# Patient Record
Sex: MALE | Race: WHITE | NOT HISPANIC OR LATINO | Employment: OTHER | URBAN - METROPOLITAN AREA
[De-identification: names, ages, dates, MRNs, and addresses within clinical notes are randomized per-mention and may not be internally consistent; named-entity substitution may affect disease eponyms.]

---

## 2017-02-15 ENCOUNTER — GENERIC CONVERSION - ENCOUNTER (OUTPATIENT)
Dept: OTHER | Facility: OTHER | Age: 82
End: 2017-02-15

## 2017-03-17 ENCOUNTER — GENERIC CONVERSION - ENCOUNTER (OUTPATIENT)
Dept: OTHER | Facility: OTHER | Age: 82
End: 2017-03-17

## 2017-03-28 ENCOUNTER — GENERIC CONVERSION - ENCOUNTER (OUTPATIENT)
Dept: OTHER | Facility: OTHER | Age: 82
End: 2017-03-28

## 2017-04-17 ENCOUNTER — ALLSCRIPTS OFFICE VISIT (OUTPATIENT)
Dept: OTHER | Facility: OTHER | Age: 82
End: 2017-04-17

## 2017-07-11 ENCOUNTER — ALLSCRIPTS OFFICE VISIT (OUTPATIENT)
Dept: OTHER | Facility: OTHER | Age: 82
End: 2017-07-11

## 2017-07-11 DIAGNOSIS — N18.4 CHRONIC KIDNEY DISEASE, STAGE IV (SEVERE) (HCC): ICD-10-CM

## 2017-07-18 ENCOUNTER — GENERIC CONVERSION - ENCOUNTER (OUTPATIENT)
Dept: OTHER | Facility: OTHER | Age: 82
End: 2017-07-18

## 2017-08-02 ENCOUNTER — GENERIC CONVERSION - ENCOUNTER (OUTPATIENT)
Dept: OTHER | Facility: OTHER | Age: 82
End: 2017-08-02

## 2017-09-18 ENCOUNTER — ALLSCRIPTS OFFICE VISIT (OUTPATIENT)
Dept: OTHER | Facility: OTHER | Age: 82
End: 2017-09-18

## 2017-09-18 ENCOUNTER — GENERIC CONVERSION - ENCOUNTER (OUTPATIENT)
Dept: OTHER | Facility: OTHER | Age: 82
End: 2017-09-18

## 2017-09-18 DIAGNOSIS — M19.079 PRIMARY OSTEOARTHRITIS, UNSPECIFIED ANKLE AND FOOT: ICD-10-CM

## 2017-09-19 ENCOUNTER — GENERIC CONVERSION - ENCOUNTER (OUTPATIENT)
Dept: OTHER | Facility: OTHER | Age: 82
End: 2017-09-19

## 2017-09-19 LAB — URIC ACID (HISTORICAL): 9.5 MG/DL (ref 3.7–8.6)

## 2017-09-25 ENCOUNTER — GENERIC CONVERSION - ENCOUNTER (OUTPATIENT)
Dept: OTHER | Facility: OTHER | Age: 82
End: 2017-09-25

## 2017-11-21 ENCOUNTER — GENERIC CONVERSION - ENCOUNTER (OUTPATIENT)
Dept: OTHER | Facility: OTHER | Age: 82
End: 2017-11-21

## 2018-01-10 NOTE — PROGRESS NOTES
Assessment    1  Medicare annual wellness visit, subsequent (V70 0) (Z00 00)   2  Need for shingles vaccine (V04 89) (Z23)    Plan  DM2 (diabetes mellitus, type 2)    · [facility] (PODIATRY ) Co-Management  *  Status: Active  Requested for:  84Cyx5437  Care Summary provided  : Yes  Medicare annual wellness visit, subsequent    · Begin a limited exercise program ; Status:Complete;   Done: 94PGZ0820   · Eat a low fat and low cholesterol diet ; Status:Complete;   Done: 69RCI5422   · These are things you can do to prevent falls in and around the home ; Status:Complete;    Done: 54NJX1490  Need for shingles vaccine    · Zostavax 26736 UNT/0 65ML Subcutaneous Solution Reconstituted (Zostavax  99865 UNT/0 65ML Subcutaneous Solution Reconstituted); as directed    Discussion/Summary    AWV completed  Advised him to bring in a copy of advance directives  Shingles vaccine ordered  Impression: Subsequent Annual Wellness Visit, with preventive exam as well as age and risk appropriate counseling completed  Cardiovascular screening and counseling: the risks and benefits of screening were discussed and screening is current  Diabetes screening and counseling: the risks and benefits of screening were discussed and screening is current  Colorectal cancer screening and counseling: the risks and benefits of screening were discussed and screening not indicated  Prostate cancer screening and counseling: the risks and benefits of screening were discussed and screening not indicated  Osteoporosis screening and counseling: the risks and benefits of screening were discussed and screening not indicated  Abdominal aortic aneurysm screening and counseling: the patient declines screening  Glaucoma screening and counseling: the risks and benefits of screening were discussed and screening is current  HIV screening and counseling: the patient declines screening and screening not indicated   Immunizations: the risks and benefits of influenza vaccination were discussed with the patient, influenza vaccine is up to date this year, the risks and benefits of pneumococcal vaccination were discussed with the patient, the lifetime pneumococcal vaccine has been completed, the risks and benefits of the Zostavax vaccine were discussed with the patient and Zostavax vaccine needed today  Advance Directive Planning: complete and up to date, he was encouraged to follow-up with me to discuss his questions and/or decisions, Adised him to bring a copy in  Chief Complaint  Patient is here today for Medicare Wellness      History of Present Illness  The patient is being seen for the subsequent annual wellness visit  Medicare Screening and Risk Factors   Hospitalizations: no previous hospitalizations  Once per lifetime medicare screening tests: AAA screening [place] has not yet been done  Medicare Screening Tests Risk Questions   Abdominal aortic aneurysm risk assessment: tobacco use and over 72years of age  Osteoporosis risk assessment: , over 48years of age and past medical history of fracture(s), but none indicated  HIV risk assessment: none indicated  Drug and Alcohol Use: The patient is a former cigarette smoker and quit smoking 37 years ago  The patient reports never drinking alcohol  He has never used illicit drugs  Diet and Physical Activity: Current diet includes well balanced meals, limited junk food, 2 servings of fruit per day, 3 servings of vegetables per day, 1 servings of meat per day, 2 servings of whole grains per day, 1 servings of dairy products per day, 1 cups of coffee per day and 1 cans of diet soda per day  He is sedentary  Exercise: walking 30 minutes per day  Mood Disorder and Cognitive Impairment Screening: PHQ-9 Depression Scale   Depression screening  no significant symptoms  He denies feeling down, depressed, or hopeless over the past two weeks   He denies feeling little interest or pleasure in doing things over the past two weeks  Cognitive impairment screening: denies difficulty learning/retaining new information, denies difficulty handling complex tasks, denies difficulty with reasoning, denies difficulty with language and denies difficulty with behavior  Functional Ability/Level of Safety: Hearing is normal bilaterally, normal in the right ear, normal in the left ear and a hearing aid is not used  The patient is currently able to do activities of daily living without limitations, able to do instrumental activities of daily living without limitations and able to drive without limitations  Activities of daily living details: needs help managing medications and needs help managing money, but does not need help using the phone, no transportation help needed, does not need help shopping, no meal preparation help needed, does not need help doing housework and does not need help doing laundry  Fall risk factors:  polypharmacy and antihypertensive use, but The patient fell 0 times in the past 12 months  Home safety risk factors:  no unfamiliar surroundings, no loose rugs, no poor household lighting, no uneven floors, no household clutter, grab bars in the bathroom and handrails on the stairs  Advance Directives: Advance directives: living will, durable power of  for health care directives, advance directives and their children  end of life decisions were reviewed with the patient  Co-Managers and Medical Equipment/Suppliers: See Patient Care Team      Patient Care Team    Care Team Member Role Specialty Office Kathy ANTHONY  Family Medicine [phone]   [place] ANTHONY  Specialist Nephrology [phone]   [facility] Specialist Cardiology [phone]   [facility]  Endocrinology [phone]     Review of Systems    Constitutional: negative  Eyes: negative  ENT: negative  Cardiovascular: negative  Respiratory: negative     Gastrointestinal: negative  Genitourinary: negative  Musculoskeletal: negative  Integumentary and Breasts: negative  Neurological: negative  Psychiatric: negative  Endocrine: negative  Hematologic and Lymphatic: negative  Over the past 2 weeks, how often have you been bothered by the following problems? 1 ) Little interest or pleasure in doing things? Several days  2 ) Feeling down, depressed or hopeless? Not at all    3 ) Trouble falling asleep or sleeping too much? Not at all    4 ) Feeling tired or having little energy? Several days  5 ) Poor appetite or overeating? Several days  6 ) Feeling bad about yourself, or that you are a failure, or have let yourself or your family down? Not at all    7 ) Trouble concentrating on things, such as reading a newspaper or watching television? Not at all    8 ) Moving or speaking so slowly that other people could have noticed, or the opposite, moving or speaking faster than usual? Not at all    9 ) Thoughts that you would be better off dead or of hurting yourself in some way? 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) (I25 10)   2  Chronic kidney disease (585 9) (N18 9)   3  Chronic kidney disease (CKD), stage 3 (moderate) (585 3) (N18 3)   4  Flu vaccine need (V04 81) (Z23)   5  Medicare annual wellness visit, initial (V70 0) (Z00 00)   6  Need for diphtheria-tetanus-pertussis (Tdap) vaccine, adult/adolescent (V06 1) (Z23)   7  Need for influenza vaccination (V04 81) (Z23)   8  Need for pneumococcal vaccination (V03 82) (Z23)   9  Need for shingles vaccine (V04 89) (Z23)   10  Negative depression screening   11  Normal routine physical examination (V70 0) (Z00 00)    DM2 (diabetes mellitus, type 2) (250 00) (E11 9)       History of hypothyroidism (V12 29) (Z86 39)       CKD (chronic kidney disease), stage 4 (severe) (585 4) (N18 4)          Past Medical History    1  History of Asthma (493 90) (J65 871)   2  History of Benign essential hypertension (401 1) (I10)   3  History of CABG Origin SVG-4 Insertion Sites Visualized L Postlat-1   4  History of Bell's palsy (V12 49) (Z86 69)   5  History of chronic obstructive lung disease (V12 69) (Z87 09)   6  History of diabetic retinopathy (V12 29) (Z86 39)   7  History of hypercholesterolemia (V12 29) (Z86 39)   8  History of Pneumonia (V12 61)   9  History of Vitamin D deficiency (268 9) (E55 9)    The active problems and past medical history were reviewed and updated today  Surgical History    1  History of Cataract Surgery   2  History of Cath Stent Placement   3  History Of Prior Surgery   4  History of Pacemaker Placement   5  History of Prostate Surgery   6  History of Transurethral Resection Of Prostate (TURP)    The surgical history was reviewed and updated today  Family History  Mother    1  Denied: Family history of substance abuse   2  Denied: Family history of Mental problems  Father    3  Denied: Family history of substance abuse   4  Denied: Family history of Mental problems  Sister    5  Family history of Diabetes mellitus due to underlying condition with complications    The family history was reviewed and updated today  Social History    · Active advance directive (V49 89) (Z78 9)   · Denied: Alcohol Use (History)   · Denied: Caffeine Use   · Employed   · Exercises occasionally (V49 89) (Z78 9)   · Former smoker (R98 91) (Z87 891)   ·    · Never used chewing tobacco (V49 89) (Z78 9)   · Non drinker / no alcohol use   · Occasional caffeine consumption   · Retired   · Two children  The social history was reviewed and updated today  Current Meds   1  Cozaar 50 MG Oral Tablet; TAKE 1 TABLET DAILY; Therapy: (Recorded:37Ugj3869) to Recorded   2  Imdur 60 MG TB24; TAKE 1 TABLET ONCE DAILY; Therapy: (Recorded:74Uqe6321) to Recorded   3  Iron 325 (65 Fe) MG Oral Tablet; TAKE 1 TABLET DAILY;    Therapy: (Recorded:27Lyp7299) to Recorded 4  Lasix 40 MG Oral Tablet; Take 1 tablet daily; Therapy: (Recorded:39Zja7577) to Recorded   5  Levemir FlexPen 100 UNIT/ML SOLN; 12 UNITS IN THE MORNING AND 10 UNITS AT   NIGHT; Therapy: (Recorded:52Hrk8622) to Recorded   6  Lipitor 40 MG Oral Tablet; TAKE 1 TABLET DAILY; Therapy: (Recorded:17Xow8563) to Recorded   7  Metoprolol Tartrate 50 MG Oral Tablet; Take 1 tablet twice daily; Therapy: (Recorded:17Jrx9751) to Recorded   8  Multi-Vitamin TABS; TAKE 1 TABLET DAILY; Therapy: (Recorded:42Mrx0185) to Recorded   9  NovoLOG FlexPen 100 UNIT/ML SOLN; SLIDING SCALE 6-8-8; Therapy: (Recorded:67Ymy0359) to Recorded   10  Plavix 75 MG Oral Tablet; TAKE 1 TABLET DAILY; Therapy: (Recorded:64Nxx9311) to Recorded   11  Synthroid 88 MCG Oral Tablet; TAKE 1 TABLET DAILY; Therapy: (Recorded:39Ers1822) to Recorded   12  Vitamin D 1000 UNIT Oral Tablet; TAKE 1 TABLET DAILY; Therapy: (Recorded:27Fno6380) to Recorded    The medication list was reviewed and updated today  Allergies    1  No Known Drug Allergies    Immunizations  Influenza --- Fletcher [place]: [id number] Fetch: 24-Sep-2013; Micki Arceo: 25-Sep-2014; Series4:  08-Sep-2015; Series5: 08-Sep-2015; Series6: 15-Sep-2016   PCV --- Series1: 08-Sep-2015; [place]: 08-Sep-2015   Tdap --- Fletcher Nice: Temporarily Deferred: Pt requests deferral; [place]: Temporarily Deferred: Pt  requests deferral   Tetanus --- Fletcher Nice: 08-Feb-2006   Zoster --- Fletcher Nice: Temporarily Deferred: Pt requests deferral     Future Appointments    Date/Time Provider Specialty Site   07/11/2017 01:30 PM LINDSEY Kim   Nephrology [address]     Signatures   Electronically signed by : Jo Herrera MD; Apr 17 2017 11:59AM EST                       (Author)

## 2018-01-12 NOTE — RESULT NOTES
Message  Labs ordered by Dr Renetta Giron reviewed with patient this morning      Signatures   Electronically signed by : LINDSEY Oh ; Sep 15 2016 11:23AM EST                       (Author)

## 2018-01-12 NOTE — RESULT NOTES
Message  Lab studies reviewed, ordered by other practitioners  All appear to be stable   Will review with patient office visit next week      Signatures   Electronically signed by : LINDSEY Morirs ; Mar 10 2016 10:12AM EST                       (Author)

## 2018-01-14 VITALS
HEIGHT: 66 IN | WEIGHT: 160.6 LBS | TEMPERATURE: 96.4 F | HEART RATE: 60 BPM | SYSTOLIC BLOOD PRESSURE: 128 MMHG | BODY MASS INDEX: 25.81 KG/M2 | DIASTOLIC BLOOD PRESSURE: 64 MMHG | OXYGEN SATURATION: 99 %

## 2018-01-14 VITALS
RESPIRATION RATE: 16 BRPM | SYSTOLIC BLOOD PRESSURE: 120 MMHG | HEART RATE: 69 BPM | WEIGHT: 160.5 LBS | TEMPERATURE: 97.4 F | BODY MASS INDEX: 25.79 KG/M2 | DIASTOLIC BLOOD PRESSURE: 70 MMHG | HEIGHT: 66 IN

## 2018-01-14 VITALS
HEIGHT: 66 IN | OXYGEN SATURATION: 93 % | HEART RATE: 68 BPM | SYSTOLIC BLOOD PRESSURE: 122 MMHG | DIASTOLIC BLOOD PRESSURE: 70 MMHG | TEMPERATURE: 97.2 F | BODY MASS INDEX: 26.22 KG/M2 | WEIGHT: 163.13 LBS

## 2018-01-15 NOTE — MISCELLANEOUS
Message  spoke to pt's wife  Uric acid is high    Start Colchicine  Stay on Keflex  Recheck next wk      Signatures   Electronically signed by : Carrie Saunders MD; Sep 19 2017  4:54PM EST                       (Author)

## 2018-01-22 VITALS
TEMPERATURE: 97.8 F | OXYGEN SATURATION: 98 % | HEIGHT: 66 IN | DIASTOLIC BLOOD PRESSURE: 80 MMHG | HEART RATE: 64 BPM | WEIGHT: 160 LBS | BODY MASS INDEX: 25.71 KG/M2 | SYSTOLIC BLOOD PRESSURE: 124 MMHG

## 2018-02-23 LAB
APPEARANCE UR: CLEAR
BILIRUB UR QL STRIP: NEGATIVE
BUN SERPL-MCNC: 76 MG/DL (ref 10–36)
BUN/CREAT SERPL: 30 (ref 10–24)
CALCIUM SERPL-MCNC: 8.7 MG/DL (ref 8.6–10.2)
CHLORIDE SERPL-SCNC: 99 MMOL/L (ref 96–106)
CO2 SERPL-SCNC: 23 MMOL/L (ref 18–29)
COLOR UR: YELLOW
CREAT SERPL-MCNC: 2.57 MG/DL (ref 0.76–1.27)
CREAT UR-MCNC: 67.8 MG/DL
ERYTHROCYTE [DISTWIDTH] IN BLOOD BY AUTOMATED COUNT: 14.7 % (ref 12.3–15.4)
GLUCOSE SERPL-MCNC: 122 MG/DL (ref 65–99)
GLUCOSE UR QL: NEGATIVE
HCT VFR BLD AUTO: 36.7 % (ref 37.5–51)
HGB BLD-MCNC: 11.9 G/DL (ref 13–17.7)
HGB UR QL STRIP: NEGATIVE
KETONES UR QL STRIP: NEGATIVE
LEUKOCYTE ESTERASE UR QL STRIP: NEGATIVE
MCH RBC QN AUTO: 30.2 PG (ref 26.6–33)
MCHC RBC AUTO-ENTMCNC: 32.4 G/DL (ref 31.5–35.7)
MCV RBC AUTO: 93 FL (ref 79–97)
MICRO URNS: NORMAL
NITRITE UR QL STRIP: NEGATIVE
PH UR STRIP: 6.5 [PH] (ref 5–7.5)
PHOSPHATE SERPL-MCNC: 4.3 MG/DL (ref 2.5–4.5)
PLATELET # BLD AUTO: 167 X10E3/UL (ref 150–379)
POTASSIUM SERPL-SCNC: 4.5 MMOL/L (ref 3.5–5.2)
PROT UR QL STRIP: NEGATIVE
PROT UR-MCNC: 16.2 MG/DL
PROT/CREAT UR: 239 MG/G CREAT (ref 0–200)
PTH-INTACT SERPL-MCNC: 109 PG/ML (ref 15–65)
RBC # BLD AUTO: 3.94 X10E6/UL (ref 4.14–5.8)
SL AMB EGFR AFRICAN AMERICAN: 24 ML/MIN/1.73
SL AMB EGFR NON AFRICAN AMERICAN: 21 ML/MIN/1.73
SODIUM SERPL-SCNC: 141 MMOL/L (ref 134–144)
SP GR UR: 1.01 (ref 1–1.03)
UROBILINOGEN UR STRIP-ACNC: 0.2 EU/DL (ref 0.2–1)
WBC # BLD AUTO: 8.4 X10E3/UL (ref 3.4–10.8)

## 2018-02-28 ENCOUNTER — OFFICE VISIT (OUTPATIENT)
Dept: NEPHROLOGY | Facility: CLINIC | Age: 83
End: 2018-02-28
Payer: MEDICARE

## 2018-02-28 VITALS — HEIGHT: 65 IN | BODY MASS INDEX: 26.46 KG/M2 | TEMPERATURE: 97.9 F | WEIGHT: 158.8 LBS

## 2018-02-28 DIAGNOSIS — N18.4 CKD (CHRONIC KIDNEY DISEASE) STAGE 4, GFR 15-29 ML/MIN (HCC): Primary | ICD-10-CM

## 2018-02-28 DIAGNOSIS — N18.4 TYPE 2 DIABETES MELLITUS WITH STAGE 4 CHRONIC KIDNEY DISEASE, UNSPECIFIED LONG TERM INSULIN USE STATUS: ICD-10-CM

## 2018-02-28 DIAGNOSIS — E11.22 TYPE 2 DIABETES MELLITUS WITH STAGE 4 CHRONIC KIDNEY DISEASE, UNSPECIFIED LONG TERM INSULIN USE STATUS: ICD-10-CM

## 2018-02-28 PROBLEM — E11.319 DIABETES WITH RETINOPATHY (HCC): Status: ACTIVE | Noted: 2017-04-17

## 2018-02-28 PROCEDURE — 99213 OFFICE O/P EST LOW 20 MIN: CPT | Performed by: INTERNAL MEDICINE

## 2018-02-28 RX ORDER — ISOSORBIDE MONONITRATE 60 MG/1
1 TABLET, EXTENDED RELEASE ORAL DAILY
COMMUNITY

## 2018-02-28 RX ORDER — FUROSEMIDE 40 MG/1
40 TABLET ORAL DAILY
COMMUNITY

## 2018-02-28 RX ORDER — PNV NO.95/FERROUS FUM/FOLIC AC 28MG-0.8MG
1 TABLET ORAL DAILY
COMMUNITY

## 2018-02-28 RX ORDER — METOPROLOL TARTRATE 50 MG/1
1 TABLET, FILM COATED ORAL 2 TIMES DAILY
COMMUNITY

## 2018-02-28 RX ORDER — LOSARTAN POTASSIUM 50 MG/1
1 TABLET ORAL DAILY
COMMUNITY

## 2018-02-28 RX ORDER — CLOPIDOGREL BISULFATE 75 MG/1
1 TABLET ORAL DAILY
COMMUNITY
End: 2018-08-28 | Stop reason: ALTCHOICE

## 2018-02-28 RX ORDER — ATORVASTATIN CALCIUM 40 MG/1
1 TABLET, FILM COATED ORAL DAILY
COMMUNITY

## 2018-02-28 RX ORDER — MULTIVITAMIN
1 TABLET ORAL DAILY
COMMUNITY

## 2018-02-28 NOTE — PROGRESS NOTES
NEPHROLOGY OFFICE FOLLOW UP  Lambert Palafox 80 y o  male MRN: [id number]    Encounter: [id number] 2/28/2018    REASON FOR VISIT: Lambert Palaofx is a 80 y o  male who is here on 2/28/2018 for No chief complaint on file  Aide Andrews HPI:    Sav Martines came in today for follow-up of stage IV CKD  He is feeling quite well and looks great for his age  Denies any complaint today        REVIEW OF SYSTEMS:    Review of Systems   Constitutional: Negative for activity change and fatigue  HENT: Negative for congestion and ear discharge  Eyes: Negative for photophobia and pain  Respiratory: Negative for apnea and choking  Cardiovascular: Negative for chest pain and palpitations  Gastrointestinal: Negative for abdominal distention and blood in stool  Endocrine: Negative for heat intolerance and polyphagia  Genitourinary: Negative for flank pain and urgency  Musculoskeletal: Negative for neck pain and neck stiffness  Skin: Negative for color change and wound  Allergic/Immunologic: Negative for food allergies and immunocompromised state  Neurological: Negative for seizures and facial asymmetry  Hematological: Negative for adenopathy  Does not bruise/bleed easily  Psychiatric/Behavioral: Negative for self-injury and suicidal ideas           PAST MEDICAL HISTORY:  Past Medical History:   Diagnosis Date    Chronic kidney disease     Diabetes mellitus ([address] )     Hypertension     Pacemaker     Short-term memory loss        PAST SURGICAL HISTORY:  Past Surgical History:   Procedure Laterality Date    CARDIAC SURGERY      RADIAL HEAD EXCISION         SOCIAL HISTORY:  History   Alcohol Use    Yes     Comment: RARELY      History   Drug Use No     History   Smoking Status    Former Smoker    Quit date: 2/28/1973   Smokeless Tobacco    Former User       FAMILY HISTORY:  Family History   Problem Relation Age of Onset    Heart attack Mother     Diabetes Maternal Aunt        MEDICATIONS:    Current Outpatient Prescriptions:     atorvastatin (LIPITOR) 40 mg tablet, Take 1 tablet by mouth daily, Disp: , Rfl:     cholecalciferol (VITAMIN D3) 1,000 units tablet, Take 1 tablet by mouth daily, Disp: , Rfl:     clopidogrel (PLAVIX) 75 mg tablet, Take 1 tablet by mouth daily, Disp: , Rfl:     Ferrous Sulfate (IRON) 325 (65 Fe) MG TABS, Take 1 tablet by mouth daily, Disp: , Rfl:     furosemide (LASIX) 40 mg tablet, Take 40 mg by mouth daily  , Disp: , Rfl:     insulin aspart (NovoLOG) 100 units/mL injection, Inject under the skin, Disp: , Rfl:     insulin detemir (LEVEMIR) 100 units/mL subcutaneous injection, Inject under the skin, Disp: , Rfl:     isosorbide mononitrate (IMDUR) 60 mg 24 hr tablet, Take 1 tablet by mouth daily, Disp: , Rfl:     losartan (COZAAR) 50 mg tablet, Take 1 tablet by mouth daily, Disp: , Rfl:     metoprolol tartrate (LOPRESSOR) 50 mg tablet, Take 1 tablet by mouth 2 (two) times a day, Disp: , Rfl:     Multiple Vitamin (MULTI-VITAMIN DAILY) TABS, Take 1 tablet by mouth daily, Disp: , Rfl:     PHYSICAL EXAM:  Vitals:    02/28/18 1333   Temp: 97 9 °F (36 6 °C)   TempSrc: Oral   Weight: 72 kg (158 lb 12 8 oz)   Height: 5' 5" (1 651 m)     Body mass index is 26 43 kg/m²  Physical Exam   Constitutional: He is oriented to person, place, and time  He appears well-developed  No distress  HENT:   Head: Normocephalic  Mouth/Throat: Oropharynx is clear and moist    Eyes: Conjunctivae are normal  No scleral icterus  Neck: Normal range of motion  Neck supple  No JVD present  Cardiovascular: Normal rate and normal heart sounds  Pulmonary/Chest: Effort normal and breath sounds normal  He has no wheezes  He has no rales  Abdominal: Soft  Bowel sounds are normal  There is no tenderness  Musculoskeletal: Normal range of motion  He exhibits no edema  Neurological: He is alert and oriented to person, place, and time  Skin: Skin is warm  No rash noted     Psychiatric: He has a normal mood and affect  His behavior is normal        LAB RESULTS:  Results for orders placed or performed in visit on 02/21/18   Urinalysis with reflex to microscopic   Result Value Ref Range    Specific Gravity 1 015 1 005 - 1 030    Ph 6 5 5 0 - 7 5    SL AMB COLOR, URINE Yellow Yellow    Urine Appearance Clear Clear    SL AMB LEUKOCYTE ESTERASE URINE Negative Negative    Protein Negative Negative/Trace    Glucose, Urine Negative Negative    SL AMB KETONE, URINE, QUAL  Negative Negative    SL AMB BLOOD, URINE Negative Negative    SL AMB BILIRUBIN, URINE Negative Negative    Urobilinogen Urine 0 2 0 2 - 1 0 EU/dL    SL AMB NITRITES URINE, QUAL   Negative Negative    Microscopic Examination Comment    CBC   Result Value Ref Range    SL AMB LAB WHITE BLOOD CELL COUNT 8 4 3 4 - 10 8 x10E3/uL    SL AMB LAB RED BLOOD CELLS 3 94 (L) 4 14 - 5 80 x10E6/uL    Hemoglobin 11 9 (L) 13 0 - 17 7 g/dL    Hematocrit 36 7 (L) 37 5 - 51 0 %    MCV 93 79 - 97 fL    MCH 30 2 26 6 - 33 0 pg    MCHC 32 4 31 5 - 35 7 g/dL    RDW 14 7 12 3 - 15 4 %    Platelet Count 920 704 - 379 [id number]/RU   Basic metabolic panel   Result Value Ref Range    SL AMB GLUCOSE 122 (H) 65 - 99 mg/dL    BUN 76 (HH) 10 - 36 mg/dL    Creatinine, Serum 2 57 (H) 0 76 - 1 27 mg/dL    eGFR Non African American 21 (L) >59 mL/min/1 73    SL AMB EGFR AFRICAN AMERICAN 24 (L) >59 mL/min/1 73    SL AMB BUN/CREATININE RATIO 30 (H) 10 - 24    SL AMB SODIUM 141 134 - 144 mmol/L    SL AMB POTASSIUM 4 5 3 5 - 5 2 mmol/L    SL AMB CHLORIDE 99 96 - 106 mmol/L    SL AMB CARBON DIOXIDE 23 18 - 29 mmol/L    CALCIUM 8 7 8 6 - 10 2 mg/dL   Protein / creatinine ratio, urine   Result Value Ref Range    Creatinine, Urine 67 8 Not Estab  mg/dL    SL AMB TOTAL PROTEIN, URINE 16 2 Not Estab  mg/dL    Prot/Creat Ratio, Ur 239 (H) 0 - 200 mg/g creat   Phosphorus   Result Value Ref Range    Phosphorus, Serum 4 3 2 5 - 4 5 mg/dL   PTH, intact   Result Value Ref Range    PTH, Intact 109 (H) 15 - 65 pg/mL ASSESSMENT and PLAN:      Stage 4 chronic kidney disease ([facility] )  Renal function is stable at GFR of 24  Advised him to continue what is doing  He is good drinking quite a bit of water which I advised him to continue  Advised to avoid any nephrotoxic medicine of procedure    DM2 (diabetes mellitus, type 2) ([facility] )  Diabetes is reasonably well controlled with present medication  It is being closely monitored by primary doctor        I will see him back in 6 months again  Goal is to keep him comfortable at his age  He will call me if there is any the problem and I will get any lab report which was order by any other doctor  Portions of the record may have been created with voice recognition software  Occasional wrong word or "sound a like" substitutions may have occurred due to the inherent limitations of voice recognition software  Read the chart carefully and recognize, using context, where substitutions have occurred  If you have any questions, please contact the dictating provider

## 2018-02-28 NOTE — PATIENT INSTRUCTIONS
Chronic Kidney Disease   AMBULATORY CARE:   Chronic kidney disease (CKD)  is the gradual and permanent loss of kidney function  Normally, the kidneys remove fluid, chemicals, and waste from your blood  These wastes are turned into urine by your kidneys  CKD may worsen over time and lead to kidney failure  Common symptoms include the following:   · Changes in how often you need to urinate    · Swelling in your arms, legs, or feet    · Shortness of breath    · Fatigue or weakness    · Bad or bitter taste in your mouth    · Nausea, vomiting, or loss of appetite  Seek care immediately if:   · You are confused and very drowsy  · You have a seizure  · You have shortness of breath  Contact your healthcare provider if:   · You suddenly gain or lose more weight than your healthcare provider has told you is okay  · You have itchy skin or a rash  · You urinate more or less than you normally do  · You have blood in your urine  · You have nausea and repeated vomiting  · You have fatigue or muscle weakness  · You have hiccups that will not stop  · You have questions or concerns about your condition or care  Treatment for CKD:  Medicines may be given to decrease blood pressure and get rid of extra fluid  You may also receive medicine to manage health conditions that may occur with CKD  Dialysis is a treatment to remove chemicals and waste from your blood when your kidneys can no longer do this  Surgery may be needed to create an arteriovenous fistula (AVF) in your arm or insert a catheter into your abdomen so that you can receive dialysis  A kidney transplant may be done if your CKD becomes severe  Manage CKD:   · Maintain a healthy weight  Ask your healthcare provider how much you should weigh  Ask him to help you create a weight loss plan if you are overweight  · Exercise 30 to 60 minutes a day, 4 to 7 times a week, or as directed  Ask about the best exercise plan for you   Regular exercise can help you manage CKD, high blood pressure, and diabetes  · Follow your healthcare provider's advice about what to eat and drink  He may tell you to eat food low in sodium (salt), potassium, phosphorus, or protein  You may need to see a dietitian if you need help planning meals  Ask how much liquid to drink each day and which liquids are best for you  · Limit alcohol  Ask how much alcohol is safe for you to drink  A drink of alcohol is 12 ounces of beer, 5 ounces of wine, or 1½ ounces of liquor  · Do not smoke  Nicotine and other chemicals in cigarettes and cigars can cause lung and kidney damage  Ask your healthcare provider for information if you currently smoke and need help to quit  E-cigarettes or smokeless tobacco still contain nicotine  Talk to your healthcare provider before you use these products  · Ask your healthcare provider if you need vaccines  Infections such as pneumonia, influenza, and hepatitis can be more harmful or more likely to occur in a person who has CKD  Vaccines reduce your risk of infection with these viruses  Follow up with your healthcare provider as directed:  Write down your questions so you remember to ask them during your visits  © 2017 [employer] Information is for End User's use only and may not be sold, redistributed or otherwise used for commercial purposes  All illustrations and images included in CareNotes® are the copyrighted property of [employer]  or Mikey Velazquez  The above information is an  only  It is not intended as medical advice for individual conditions or treatments  Talk to your doctor, nurse or pharmacist before following any medical regimen to see if it is safe and effective for you

## 2018-02-28 NOTE — ASSESSMENT & PLAN NOTE
Renal function is stable at GFR of 24  Advised him to continue what is doing  He is good drinking quite a bit of water which I advised him to continue    Advised to avoid any nephrotoxic medicine of procedure

## 2018-02-28 NOTE — ASSESSMENT & PLAN NOTE
Diabetes is reasonably well controlled with present medication    It is being closely monitored by primary doctor

## 2018-02-28 NOTE — LETTER
February 28, 2018     Contreras Torres MD  [address]  [address] [facility]  [address]    Patient: Chasity Newman   YOB: 1924   Date of Visit: 2/28/2018       Dear Dr Andre Luevano: Thank you for referring Adán Guzman to me for evaluation  Below are my notes for this consultation  If you have questions, please do not hesitate to call me  I look forward to following your patient along with you  Sincerely,        Otoniel Toney MD        CC: No Recipients  Otoniel Toney MD  2/28/2018  2:07 PM  Sign at close encounter  [address] 80 y o  male MRN: [id number]    Encounter: [id number] 2/28/2018    REASON FOR VISIT: Chasity Newman is a 80 y o  male who is here on 2/28/2018 for No chief complaint on file  Lajean Cassette HPI:    Carl Sanders came in today for follow-up of stage IV CKD  He is feeling quite well and looks great for his age  Denies any complaint today        REVIEW OF SYSTEMS:    Review of Systems   Constitutional: Negative for activity change and fatigue  HENT: Negative for congestion and ear discharge  Eyes: Negative for photophobia and pain  Respiratory: Negative for apnea and choking  Cardiovascular: Negative for chest pain and palpitations  Gastrointestinal: Negative for abdominal distention and blood in stool  Endocrine: Negative for heat intolerance and polyphagia  Genitourinary: Negative for flank pain and urgency  Musculoskeletal: Negative for neck pain and neck stiffness  Skin: Negative for color change and wound  Allergic/Immunologic: Negative for food allergies and immunocompromised state  Neurological: Negative for seizures and facial asymmetry  Hematological: Negative for adenopathy  Does not bruise/bleed easily  Psychiatric/Behavioral: Negative for self-injury and suicidal ideas           PAST MEDICAL HISTORY:  Past Medical History:   Diagnosis Date    Chronic kidney disease     Diabetes mellitus ([facility] )     Hypertension     Pacemaker     Short-term memory loss        PAST SURGICAL HISTORY:  Past Surgical History:   Procedure Laterality Date    CARDIAC SURGERY      RADIAL HEAD EXCISION         SOCIAL HISTORY:  History   Alcohol Use    Yes     Comment: RARELY      History   Drug Use No     History   Smoking Status    Former Smoker    Quit date: 2/28/1973   Smokeless Tobacco    Former User       FAMILY HISTORY:  Family History   Problem Relation Age of Onset    Heart attack Mother     Diabetes Maternal Aunt        MEDICATIONS:    Current Outpatient Prescriptions:     atorvastatin (LIPITOR) 40 mg tablet, Take 1 tablet by mouth daily, Disp: , Rfl:     cholecalciferol (VITAMIN D3) 1,000 units tablet, Take 1 tablet by mouth daily, Disp: , Rfl:     clopidogrel (PLAVIX) 75 mg tablet, Take 1 tablet by mouth daily, Disp: , Rfl:     Ferrous Sulfate (IRON) 325 (65 Fe) MG TABS, Take 1 tablet by mouth daily, Disp: , Rfl:     furosemide (LASIX) 40 mg tablet, Take 40 mg by mouth daily  , Disp: , Rfl:     insulin aspart (NovoLOG) 100 units/mL injection, Inject under the skin, Disp: , Rfl:     insulin detemir (LEVEMIR) 100 units/mL subcutaneous injection, Inject under the skin, Disp: , Rfl:     isosorbide mononitrate (IMDUR) 60 mg 24 hr tablet, Take 1 tablet by mouth daily, Disp: , Rfl:     losartan (COZAAR) 50 mg tablet, Take 1 tablet by mouth daily, Disp: , Rfl:     metoprolol tartrate (LOPRESSOR) 50 mg tablet, Take 1 tablet by mouth 2 (two) times a day, Disp: , Rfl:     Multiple Vitamin (MULTI-VITAMIN DAILY) TABS, Take 1 tablet by mouth daily, Disp: , Rfl:     PHYSICAL EXAM:  Vitals:    02/28/18 1333   Temp: 97 9 °F (36 6 °C)   TempSrc: Oral   Weight: 72 kg (158 lb 12 8 oz)   Height: 5' 5" (1 651 m)     Body mass index is 26 43 kg/m²  Physical Exam   Constitutional: He is oriented to person, place, and time  He appears well-developed  No distress  HENT:   Head: Normocephalic     Mouth/Throat: Oropharynx is clear and moist    Eyes: Conjunctivae are normal  No scleral icterus  Neck: Normal range of motion  Neck supple  No JVD present  Cardiovascular: Normal rate and normal heart sounds  Pulmonary/Chest: Effort normal and breath sounds normal  He has no wheezes  He has no rales  Abdominal: Soft  Bowel sounds are normal  There is no tenderness  Musculoskeletal: Normal range of motion  He exhibits no edema  Neurological: He is alert and oriented to person, place, and time  Skin: Skin is warm  No rash noted  Psychiatric: He has a normal mood and affect  His behavior is normal        LAB RESULTS:  Results for orders placed or performed in visit on 02/21/18   Urinalysis with reflex to microscopic   Result Value Ref Range    Specific Gravity 1 015 1 005 - 1 030    Ph 6 5 5 0 - 7 5    SL AMB COLOR, URINE Yellow Yellow    Urine Appearance Clear Clear    SL AMB LEUKOCYTE ESTERASE URINE Negative Negative    Protein Negative Negative/Trace    Glucose, Urine Negative Negative    SL AMB KETONE, URINE, QUAL  Negative Negative    SL AMB BLOOD, URINE Negative Negative    SL AMB BILIRUBIN, URINE Negative Negative    Urobilinogen Urine 0 2 0 2 - 1 0 EU/dL    SL AMB NITRITES URINE, QUAL   Negative Negative    Microscopic Examination Comment    CBC   Result Value Ref Range    SL AMB LAB WHITE BLOOD CELL COUNT 8 4 3 4 - 10 8 x10E3/uL    SL AMB LAB RED BLOOD CELLS 3 94 (L) 4 14 - 5 80 x10E6/uL    Hemoglobin 11 9 (L) 13 0 - 17 7 g/dL    Hematocrit 36 7 (L) 37 5 - 51 0 %    MCV 93 79 - 97 fL    MCH 30 2 26 6 - 33 0 pg    MCHC 32 4 31 5 - 35 7 g/dL    RDW 14 7 12 3 - 15 4 %    Platelet Count 292 950 - 379 [id number]   Basic metabolic panel   Result Value Ref Range    SL AMB GLUCOSE 122 (H) 65 - 99 mg/dL    BUN 76 (HH) 10 - 36 mg/dL    Creatinine, Serum 2 57 (H) 0 76 - 1 27 mg/dL    eGFR Non African American 21 (L) >59 mL/min/1 73    SL AMB EGFR AFRICAN AMERICAN 24 (L) >59 mL/min/1 73    SL AMB BUN/CREATININE RATIO 30 (H) 10 - 24 SL AMB SODIUM 141 134 - 144 mmol/L    SL AMB POTASSIUM 4 5 3 5 - 5 2 mmol/L    SL AMB CHLORIDE 99 96 - 106 mmol/L    SL AMB CARBON DIOXIDE 23 18 - 29 mmol/L    CALCIUM 8 7 8 6 - 10 2 mg/dL   Protein / creatinine ratio, urine   Result Value Ref Range    Creatinine, Urine 67 8 Not Estab  mg/dL    SL AMB TOTAL PROTEIN, URINE 16 2 Not Estab  mg/dL    Prot/Creat Ratio, Ur 239 (H) 0 - 200 mg/g creat   Phosphorus   Result Value Ref Range    Phosphorus, Serum 4 3 2 5 - 4 5 mg/dL   PTH, intact   Result Value Ref Range    PTH, Intact 109 (H) 15 - 65 pg/mL       ASSESSMENT and PLAN:      Stage 4 chronic kidney disease (HCC)  Renal function is stable at GFR of 24  Advised him to continue what is doing  He is good drinking quite a bit of water which I advised him to continue  Advised to avoid any nephrotoxic medicine of procedure    DM2 (diabetes mellitus, type 2) ([facility] [address] )  Diabetes is reasonably well controlled with present medication  It is being closely monitored by primary doctor        I will see him back in 6 months again  Goal is to keep him comfortable at his age  He will call me if there is any the problem and I will get any lab report which was order by any other doctor  Portions of the record may have been created with voice recognition software  Occasional wrong word or "sound a like" substitutions may have occurred due to the inherent limitations of voice recognition software  Read the chart carefully and recognize, using context, where substitutions have occurred  If you have any questions, please contact the dictating provider

## 2018-03-19 ENCOUNTER — OFFICE VISIT (OUTPATIENT)
Dept: FAMILY MEDICINE CLINIC | Facility: CLINIC | Age: 83
End: 2018-03-19
Payer: MEDICARE

## 2018-03-19 VITALS
SYSTOLIC BLOOD PRESSURE: 120 MMHG | BODY MASS INDEX: 26.26 KG/M2 | HEIGHT: 65 IN | WEIGHT: 157.6 LBS | TEMPERATURE: 96.4 F | HEART RATE: 69 BPM | OXYGEN SATURATION: 100 % | DIASTOLIC BLOOD PRESSURE: 82 MMHG

## 2018-03-19 DIAGNOSIS — N18.4 TYPE 2 DIABETES MELLITUS WITH STAGE 4 CHRONIC KIDNEY DISEASE, WITH LONG-TERM CURRENT USE OF INSULIN (HCC): Primary | ICD-10-CM

## 2018-03-19 DIAGNOSIS — Z79.4 TYPE 2 DIABETES MELLITUS WITH STAGE 4 CHRONIC KIDNEY DISEASE, WITH LONG-TERM CURRENT USE OF INSULIN (HCC): Primary | ICD-10-CM

## 2018-03-19 DIAGNOSIS — E11.22 TYPE 2 DIABETES MELLITUS WITH STAGE 4 CHRONIC KIDNEY DISEASE, WITH LONG-TERM CURRENT USE OF INSULIN (HCC): Primary | ICD-10-CM

## 2018-03-19 DIAGNOSIS — N18.4 STAGE 4 CHRONIC KIDNEY DISEASE (HCC): ICD-10-CM

## 2018-03-19 DIAGNOSIS — I25.10 ARTERIOSCLEROTIC CARDIOVASCULAR DISEASE: ICD-10-CM

## 2018-03-19 PROCEDURE — 99214 OFFICE O/P EST MOD 30 MIN: CPT | Performed by: FAMILY MEDICINE

## 2018-03-19 NOTE — PROGRESS NOTES
Assessment/Plan:       Diagnoses and all orders for this visit:    Type 2 diabetes mellitus with stage 4 chronic kidney disease, with long-term current use of insulin (HCC)    Stage 4 chronic kidney disease ([facility] )    Arteriosclerotic cardiovascular disease        Doing well, continue current regimen  Continue with specialists  Will try to get records from Dr Brijesh Coronado  Subjective:      Patient ID: Xavier Maloney is a 80 y o  male  Here for general check up  No acute c/o  He has a h/o DM - sees Dr Rigo Jane  Does not know last A1c  Next appt with Endo is tomorrow  He is on Levemir 12 units in the AM 10 units Pm, Novolog 6 units breakfast, 8 lunch, 8 dinner  He states he was told he is doing well  No hypoglycemia  H/O CAD - sees Dr Natasha Marina  Denies any recent Cp, SOB  Is seeing Dr Natasha Marina today  H/o CKD - sees Dr Cassy Baldwin  Has been stable  The following portions of the patient's history were reviewed and updated as appropriate:   He  has a past medical history of Chronic kidney disease; Diabetes mellitus ([facility] ); Hypertension; Pacemaker; and Short-term memory loss  He   Patient Active Problem List    Diagnosis Date Noted    Diabetes with retinopathy ([place] [address] ) 04/17/2017    Stage 4 chronic kidney disease ([facility] ) 12/28/2016    Arteriosclerotic cardiovascular disease 09/24/2013    Chronic kidney disease 09/23/2013    DM2 (diabetes mellitus, type 2) ([place] [address] ) 09/23/2013     He  has a past surgical history that includes Radial head excision and Cardiac surgery  His family history includes Diabetes in his maternal aunt; Heart attack in his mother  He  reports that he quit smoking about 45 years ago  He has quit using smokeless tobacco  He reports that he drinks alcohol  He reports that he does not use drugs    Current Outpatient Prescriptions   Medication Sig Dispense Refill    atorvastatin (LIPITOR) 40 mg tablet Take 1 tablet by mouth daily      cholecalciferol (VITAMIN D3) 1,000 units tablet Take 1 tablet by mouth daily      clopidogrel (PLAVIX) 75 mg tablet Take 1 tablet by mouth daily      Ferrous Sulfate (IRON) 325 (65 Fe) MG TABS Take 1 tablet by mouth daily      furosemide (LASIX) 40 mg tablet Take 40 mg by mouth daily Take 1 1/2 daily       insulin aspart (NovoLOG) 100 units/mL injection Inject under the skin      insulin detemir (LEVEMIR) 100 units/mL subcutaneous injection Inject under the skin      isosorbide mononitrate (IMDUR) 60 mg 24 hr tablet Take 1 tablet by mouth daily      losartan (COZAAR) 50 mg tablet Take 1 tablet by mouth daily      metoprolol tartrate (LOPRESSOR) 50 mg tablet Take 1 tablet by mouth 2 (two) times a day      Multiple Vitamin (MULTI-VITAMIN DAILY) TABS Take 1 tablet by mouth daily       No current facility-administered medications for this visit  Current Outpatient Prescriptions on File Prior to Visit   Medication Sig    atorvastatin (LIPITOR) 40 mg tablet Take 1 tablet by mouth daily    cholecalciferol (VITAMIN D3) 1,000 units tablet Take 1 tablet by mouth daily    clopidogrel (PLAVIX) 75 mg tablet Take 1 tablet by mouth daily    Ferrous Sulfate (IRON) 325 (65 Fe) MG TABS Take 1 tablet by mouth daily    furosemide (LASIX) 40 mg tablet Take 40 mg by mouth daily Take 1 1/2 daily     insulin aspart (NovoLOG) 100 units/mL injection Inject under the skin    insulin detemir (LEVEMIR) 100 units/mL subcutaneous injection Inject under the skin    isosorbide mononitrate (IMDUR) 60 mg 24 hr tablet Take 1 tablet by mouth daily    losartan (COZAAR) 50 mg tablet Take 1 tablet by mouth daily    metoprolol tartrate (LOPRESSOR) 50 mg tablet Take 1 tablet by mouth 2 (two) times a day    Multiple Vitamin (MULTI-VITAMIN DAILY) TABS Take 1 tablet by mouth daily     No current facility-administered medications on file prior to visit  He has No Known Allergies       Review of Systems   Constitutional: Negative for activity change, appetite change, chills, fatigue, fever and unexpected weight change  HENT: Negative for congestion, ear discharge, ear pain, postnasal drip, sinus pressure and sore throat  Eyes: Negative for discharge and visual disturbance  Respiratory: Negative for cough, shortness of breath and wheezing  Cardiovascular: Negative for chest pain, palpitations and leg swelling  Gastrointestinal: Negative for abdominal pain, constipation, diarrhea, nausea and vomiting  Endocrine: Negative for cold intolerance, heat intolerance, polydipsia and polyuria  Genitourinary: Negative for difficulty urinating and frequency  Musculoskeletal: Negative for arthralgias, back pain, joint swelling and myalgias  Skin: Negative for rash  Neurological: Negative for dizziness, weakness, light-headedness, numbness and headaches  Hematological: Negative for adenopathy  Psychiatric/Behavioral: Negative for behavioral problems, confusion, dysphoric mood, sleep disturbance and suicidal ideas  The patient is not nervous/anxious  Objective:      /82   Pulse 69   Temp (!) 96 4 °F (35 8 °C)   Ht 5' 5" (1 651 m)   Wt 71 5 kg (157 lb 9 6 oz)   SpO2 100%   BMI 26 23 kg/m²          Physical Exam   Constitutional: He is oriented to person, place, and time  He appears well-developed and well-nourished  No distress  HENT:   Head: Normocephalic and atraumatic  Mouth/Throat: Oropharynx is clear and moist  No oropharyngeal exudate  Cardiovascular: Normal rate, regular rhythm and normal heart sounds  Exam reveals no gallop and no friction rub  No murmur heard  Pulmonary/Chest: Effort normal and breath sounds normal  No respiratory distress  He has no wheezes  He has no rales  He exhibits no tenderness  Musculoskeletal: He exhibits no edema  Neurological: He is alert and oriented to person, place, and time  Skin: He is not diaphoretic  Psychiatric: He has a normal mood and affect   His behavior is normal  Judgment and thought content normal    Nursing note and vitals reviewed

## 2018-06-19 LAB
LEFT EYE DIABETIC RETINOPATHY: NORMAL
RIGHT EYE DIABETIC RETINOPATHY: NORMAL

## 2018-07-17 LAB — HBA1C MFR BLD HPLC: 6.2 %

## 2018-08-22 LAB
APPEARANCE UR: CLEAR
BASOPHILS # BLD AUTO: 0.1 X10E3/UL (ref 0–0.2)
BASOPHILS NFR BLD AUTO: 1 %
BILIRUB UR QL STRIP: NEGATIVE
BUN SERPL-MCNC: 84 MG/DL (ref 10–36)
BUN/CREAT SERPL: 33 (ref 10–24)
CALCIUM SERPL-MCNC: 9.1 MG/DL (ref 8.6–10.2)
CHLORIDE SERPL-SCNC: 98 MMOL/L (ref 96–106)
CO2 SERPL-SCNC: 23 MMOL/L (ref 20–29)
COLOR UR: YELLOW
CREAT SERPL-MCNC: 2.51 MG/DL (ref 0.76–1.27)
CREAT UR-MCNC: 60 MG/DL
EOSINOPHIL # BLD AUTO: 0.7 X10E3/UL (ref 0–0.4)
EOSINOPHIL NFR BLD AUTO: 9 %
ERYTHROCYTE [DISTWIDTH] IN BLOOD BY AUTOMATED COUNT: 13.7 % (ref 12.3–15.4)
GLUCOSE SERPL-MCNC: 107 MG/DL (ref 65–99)
GLUCOSE UR QL: NEGATIVE
HCT VFR BLD AUTO: 33.4 % (ref 37.5–51)
HGB BLD-MCNC: 10.8 G/DL (ref 13–17.7)
HGB UR QL STRIP: NEGATIVE
IMM GRANULOCYTES # BLD: 0 X10E3/UL (ref 0–0.1)
IMM GRANULOCYTES NFR BLD: 0 %
KETONES UR QL STRIP: NEGATIVE
LEUKOCYTE ESTERASE UR QL STRIP: NEGATIVE
LYMPHOCYTES # BLD AUTO: 1.8 X10E3/UL (ref 0.7–3.1)
LYMPHOCYTES NFR BLD AUTO: 22 %
MCH RBC QN AUTO: 30.9 PG (ref 26.6–33)
MCHC RBC AUTO-ENTMCNC: 32.3 G/DL (ref 31.5–35.7)
MCV RBC AUTO: 95 FL (ref 79–97)
MICRO URNS: NORMAL
MONOCYTES # BLD AUTO: 0.8 X10E3/UL (ref 0.1–0.9)
MONOCYTES NFR BLD AUTO: 10 %
MORPHOLOGY BLD-IMP: ABNORMAL
NEUTROPHILS # BLD AUTO: 4.6 X10E3/UL (ref 1.4–7)
NEUTROPHILS NFR BLD AUTO: 58 %
NITRITE UR QL STRIP: NEGATIVE
PH UR STRIP: 6 [PH] (ref 5–7.5)
PHOSPHATE SERPL-MCNC: 4.6 MG/DL (ref 2.5–4.5)
PLATELET # BLD AUTO: 172 X10E3/UL (ref 150–379)
POTASSIUM SERPL-SCNC: 4.5 MMOL/L (ref 3.5–5.2)
PROT UR QL STRIP: NORMAL
PROT UR-MCNC: 19.3 MG/DL
PROT/CREAT UR: 322 MG/G CREAT (ref 0–200)
PTH, INTACT INTERPRETATION: ABNORMAL
PTH-INTACT SERPL-MCNC: 97 PG/ML (ref 15–65)
RBC # BLD AUTO: 3.5 X10E6/UL (ref 4.14–5.8)
SL AMB EGFR AFRICAN AMERICAN: 24 ML/MIN/1.73
SL AMB EGFR NON AFRICAN AMERICAN: 21 ML/MIN/1.73
SODIUM SERPL-SCNC: 142 MMOL/L (ref 134–144)
SP GR UR: 1.02 (ref 1–1.03)
UROBILINOGEN UR STRIP-ACNC: 0.2 EU/DL (ref 0.2–1)
WBC # BLD AUTO: 8 X10E3/UL (ref 3.4–10.8)

## 2018-08-28 ENCOUNTER — OFFICE VISIT (OUTPATIENT)
Dept: NEPHROLOGY | Facility: CLINIC | Age: 83
End: 2018-08-28
Payer: MEDICARE

## 2018-08-28 VITALS
RESPIRATION RATE: 16 BRPM | SYSTOLIC BLOOD PRESSURE: 120 MMHG | TEMPERATURE: 98.3 F | HEART RATE: 80 BPM | DIASTOLIC BLOOD PRESSURE: 70 MMHG | HEIGHT: 65 IN | BODY MASS INDEX: 25.83 KG/M2 | WEIGHT: 155 LBS

## 2018-08-28 DIAGNOSIS — E11.22 TYPE 2 DIABETES MELLITUS WITH STAGE 4 CHRONIC KIDNEY DISEASE, WITH LONG-TERM CURRENT USE OF INSULIN (HCC): ICD-10-CM

## 2018-08-28 DIAGNOSIS — I25.10 ARTERIOSCLEROTIC CARDIOVASCULAR DISEASE: ICD-10-CM

## 2018-08-28 DIAGNOSIS — Z79.4 TYPE 2 DIABETES MELLITUS WITH STAGE 4 CHRONIC KIDNEY DISEASE, WITH LONG-TERM CURRENT USE OF INSULIN (HCC): ICD-10-CM

## 2018-08-28 DIAGNOSIS — N18.4 TYPE 2 DIABETES MELLITUS WITH STAGE 4 CHRONIC KIDNEY DISEASE, WITH LONG-TERM CURRENT USE OF INSULIN (HCC): ICD-10-CM

## 2018-08-28 DIAGNOSIS — N18.4 STAGE 4 CHRONIC KIDNEY DISEASE (HCC): Primary | ICD-10-CM

## 2018-08-28 PROCEDURE — 99213 OFFICE O/P EST LOW 20 MIN: CPT | Performed by: INTERNAL MEDICINE

## 2018-08-28 RX ORDER — APIXABAN 2.5 MG/1
2.5 TABLET, FILM COATED ORAL 2 TIMES DAILY
Refills: 5 | COMMUNITY
Start: 2018-08-12

## 2018-08-28 NOTE — LETTER
August 28, 2018     Miquel Stover MD  [address]  [address]    Patient: Prashant Ochoa   YOB: 1924   Date of Visit: 8/28/2018       Dear Dr Leonor Bae: Thank you for referring Tanya Castano to me for evaluation  Below are my notes for this consultation  If you have questions, please do not hesitate to call me  I look forward to following your patient along with you  Sincerely,        Benjamin Castañeda MD        CC: No Recipients  Benjamin Castañeda MD  8/28/2018  1:58 PM  Sign at close encounter  [address] 80 y o  male MRN: [id number]    Encounter: [id number] 8/28/2018    REASON FOR VISIT: Prashant Ochoa is a 80 y o  male who is here on 8/28/2018 for Follow-up and CKD IV    HPI:    Elysia Weiner came in today for follow-up of stage IV CKD  80-year-old gentleman looks quite good for his age  Still quite active though he is getting unsteady  According to his wife he is quite wobbly when he walk  Need some support  Has leg weakness  No headache no dizziness no chest pain        REVIEW OF SYSTEMS:    Review of Systems   Constitutional: Negative for activity change and fatigue  HENT: Negative for congestion and ear discharge  Eyes: Negative for photophobia and pain  Respiratory: Negative for apnea and choking  Cardiovascular: Negative for chest pain and palpitations  Gastrointestinal: Negative for abdominal distention and blood in stool  Endocrine: Negative for heat intolerance and polyphagia  Genitourinary: Negative for flank pain and urgency  Musculoskeletal: Positive for gait problem  Negative for neck pain and neck stiffness  Skin: Negative for color change and wound  Allergic/Immunologic: Negative for food allergies and immunocompromised state  Neurological: Negative for seizures and facial asymmetry  Hematological: Negative for adenopathy  Does not bruise/bleed easily     Psychiatric/Behavioral: Negative for self-injury and suicidal ideas           PAST MEDICAL HISTORY:  Past Medical History:   Diagnosis Date    Asthma     Bell's palsy     Chronic kidney disease     Chronic obstructive lung disease [facility] )     Diabetes mellitus ([facility] )     Diabetic retinopathy ([facility] )     Hypercholesterolemia     Hypertension     BENIGN ESSENTIAL    Pacemaker     Pneumonia     Short-term memory loss     Vitamin D deficiency        PAST SURGICAL HISTORY:  Past Surgical History:   Procedure Laterality Date    CARDIAC PACEMAKER PLACEMENT      PACEMAKER/DEFIBRILATTOR- [id number]    CARDIAC SURGERY      CATARACT EXTRACTION      CORONARY ANGIOPLASTY WITH STENT PLACEMENT  2000    OPEN HEART WITH TRIPLE BYPASS    OTHER SURGICAL HISTORY      SUBDURAL HEM    PROSTATE SURGERY  1998    RADIAL HEAD EXCISION      TRANSURETHRAL RESECTION OF PROSTATE         SOCIAL HISTORY:  History   Alcohol Use    Yes     Comment: RARELY; NON DRINKER/NO ALCOHOL USE AS PER ALL SCRIPTS      History   Drug Use No     History   Smoking Status    Former Smoker    Packs/day: 1 00    Years: 37 00    Quit date: 2/28/1973   Smokeless Tobacco    Former User     Comment: NEVER USED CHEWING TOBACCO AS PER ALL SCRIPTS        FAMILY HISTORY:  Family History   Problem Relation Age of Onset    Heart attack Mother     Diabetes Maternal Aunt     Diabetes Sister         DUE TO UNDERLYING CONDITIONS WITH COMPLICATIONS        MEDICATIONS:    Current Outpatient Prescriptions:     atorvastatin (LIPITOR) 40 mg tablet, Take 1 tablet by mouth daily, Disp: , Rfl:     cholecalciferol (VITAMIN D3) 1,000 units tablet, Take 1 tablet by mouth daily, Disp: , Rfl:     ELIQUIS 2 5 MG, Take 2 5 mg by mouth 2 (two) times a day, Disp: , Rfl: 5    Ferrous Sulfate (IRON) 325 (65 Fe) MG TABS, Take 1 tablet by mouth daily, Disp: , Rfl:     furosemide (LASIX) 40 mg tablet, Take 40 mg by mouth daily Take 1 1/2 daily , Disp: , Rfl:     insulin aspart (NovoLOG) 100 units/mL injection, Inject under the skin, Disp: , Rfl:     insulin detemir (LEVEMIR) 100 units/mL subcutaneous injection, Inject under the skin, Disp: , Rfl:     isosorbide mononitrate (IMDUR) 60 mg 24 hr tablet, Take 1 tablet by mouth daily, Disp: , Rfl:     losartan (COZAAR) 50 mg tablet, Take 1 tablet by mouth daily, Disp: , Rfl:     metoprolol tartrate (LOPRESSOR) 50 mg tablet, Take 1 tablet by mouth 2 (two) times a day, Disp: , Rfl:     Multiple Vitamin (MULTI-VITAMIN DAILY) TABS, Take 1 tablet by mouth daily, Disp: , Rfl:     PHYSICAL EXAM:  Vitals:    08/28/18 1321   BP: 120/70   BP Location: Right arm   Patient Position: Sitting   Pulse: 80   Resp: 16   Temp: 98 3 °F (36 8 °C)   TempSrc: Tympanic   Weight: 70 3 kg (155 lb)   Height: 5' 5" (1 651 m)     Body mass index is 25 79 kg/m²  Physical Exam   Constitutional: He is oriented to person, place, and time  He appears well-developed  No distress  HENT:   Head: Normocephalic  Mouth/Throat: Oropharynx is clear and moist    Eyes: Conjunctivae and EOM are normal  Pupils are equal, round, and reactive to light  No scleral icterus  Neck: Normal range of motion  Neck supple  No JVD present  Cardiovascular: Normal rate, regular rhythm and normal heart sounds  No murmur heard  Pulmonary/Chest: Effort normal and breath sounds normal  No respiratory distress  He has no wheezes  He has no rales  Abdominal: Soft  There is no tenderness  Musculoskeletal: Normal range of motion  He exhibits no edema  Neurological: He is alert and oriented to person, place, and time  Skin: Skin is warm  No rash noted  Psychiatric: He has a normal mood and affect   His behavior is normal        LAB RESULTS:  Results for orders placed or performed in visit on 08/20/18   CBC and differential   Result Value Ref Range    White Blood Cell Count 8 0 3 4 - 10 8 x10E3/uL    Red Blood Cell Count 3 50 (L) 4 14 - 5 80 x10E6/uL    Hemoglobin 10 8 (L) 13 0 - 17 7 g/dL    HCT 33 4 (L) 37 5 - 51 0 %    MCV 95 79 - 97 fL    MCH 30 9 26 6 - 33 0 pg    MCHC 32 3 31 5 - 35 7 g/dL    RDW 13 7 12 3 - 15 4 %    Platelet Count 807 563 - 379 x10E3/uL    Neutrophils 58 Not Estab  %    Lymphocytes 22 Not Estab  %    Monocytes 10 Not Estab  %    Eosinophils 9 Not Estab  %    Basophils Relative 1 Not Estab  %    Neutrophils (Absolute) 4 6 1 4 - 7 0 x10E3/uL    Lymphocytes (Absolute) 1 8 0 7 - 3 1 x10E3/uL    Monocytes (Absolute) 0 8 0 1 - 0 9 x10E3/uL    Eosinophils (Absolute) 0 7 (H) 0 0 - 0 4 x10E3/uL    Basophils (Absolute) 0 1 0 0 - 0 2 x10E3/uL    Immature Granulocytes 0 Not Estab  %    Immature Granulocytes (Absolute) 0 0 0 0 - 0 1 x10E3/uL    Hematology Comments Note:    Urinalysis with reflex to microscopic   Result Value Ref Range    Specific Gravity 1 017 1 005 - 1 030    Ph 6 0 5 0 - 7 5    SL AMB COLOR, URINE Yellow Yellow    Urine Appearance Clear Clear    SL AMB LEUKOCYTE ESTERASE URINE Negative Negative    Protein Trace Negative/Trace    Glucose, Urine Negative Negative    SL AMB KETONE, URINE, QUAL  Negative Negative    SL AMB BLOOD, URINE Negative Negative    SL AMB BILIRUBIN, URINE Negative Negative    Urobilinogen Urine 0 2 0 2 - 1 0 EU/dL    SL AMB NITRITES URINE, QUAL   Negative Negative    Microscopic Examination Comment    Basic metabolic panel   Result Value Ref Range    Glucose 107 (H) 65 - 99 mg/dL    BUN 84 (HH) 10 - 36 mg/dL    Creatinine 2 51 (H) 0 76 - 1 27 mg/dL    eGFR Non African American 21 (L) >59 mL/min/1 73    SL AMB EGFR AFRICAN AMERICAN 24 (L) >59 mL/min/1 73    SL AMB BUN/CREATININE RATIO 33 (H) 10 - 24    Sodium 142 134 - 144 mmol/L    SL AMB POTASSIUM 4 5 3 5 - 5 2 mmol/L    Chloride 98 96 - 106 mmol/L    SL AMB CARBON DIOXIDE 23 20 - 29 mmol/L    CALCIUM 9 1 8 6 - 10 2 mg/dL   Protein / creatinine ratio, urine   Result Value Ref Range    Creatinine, Urine 60 0 Not Estab  mg/dL    SL AMB TOTAL PROTEIN, URINE 19 3 Not Estab  mg/dL    Prot/Creat Ratio, Ur 322 (H) 0 - 200 mg/g creat   PTH, intact and calcium   Result Value Ref Range    PTH, Intact 97 (H) 15 - 65 pg/mL    PTH,INTACT INTERP Comment    Phosphorus   Result Value Ref Range    Phosphorus, Serum 4 6 (H) 2 5 - 4 5 mg/dL       ASSESSMENT and PLAN:      Stage 4 chronic kidney disease (HCC)  Stable at this point with GFR of about 25  Advised to continue what is doing  My goal will be to keep him comfortable    Arteriosclerotic cardiovascular disease  Stable and being monitored by cardiologist   Will advised stopping Eliquis in view of unsteady gait    DM[facility] (diabetes mellitus, type 2) ([facility]  No results found for: HGBA1C    No results for input(s): POCGLU in the last 72 hours  Blood Sugar Average: Last 72 hrs:    No new blood glucose report        I will see him back in 6 month      Portions of the record may have been created with voice recognition software  Occasional wrong word or "sound a like" substitutions may have occurred due to the inherent limitations of voice recognition software  Read the chart carefully and recognize, using context, where substitutions have occurred  If you have any questions, please contact the dictating provider

## 2018-08-28 NOTE — PROGRESS NOTES
NEPHROLOGY OFFICE FOLLOW UP  Matthias Kang 80 y o  male MRN: [id number]    Encounter: [id number] 8/28/2018    REASON FOR VISIT: Matthias Kang is a 80 y o  male who is here on 8/28/2018 for Follow-up and CKD IV    HPI:    Lydia Fortune came in today for follow-up of stage IV CKD  70-year-old gentleman looks quite good for his age  Still quite active though he is getting unsteady  According to his wife he is quite wobbly when he walk  Need some support  Has leg weakness  No headache no dizziness no chest pain        REVIEW OF SYSTEMS:    Review of Systems   Constitutional: Negative for activity change and fatigue  HENT: Negative for congestion and ear discharge  Eyes: Negative for photophobia and pain  Respiratory: Negative for apnea and choking  Cardiovascular: Negative for chest pain and palpitations  Gastrointestinal: Negative for abdominal distention and blood in stool  Endocrine: Negative for heat intolerance and polyphagia  Genitourinary: Negative for flank pain and urgency  Musculoskeletal: Positive for gait problem  Negative for neck pain and neck stiffness  Skin: Negative for color change and wound  Allergic/Immunologic: Negative for food allergies and immunocompromised state  Neurological: Negative for seizures and facial asymmetry  Hematological: Negative for adenopathy  Does not bruise/bleed easily  Psychiatric/Behavioral: Negative for self-injury and suicidal ideas           PAST MEDICAL HISTORY:  Past Medical History:   Diagnosis Date    Asthma     Bell's palsy     Chronic kidney disease     Chronic obstructive lung disease (HCC)     [facility]     [facility]     Hypercholesterolemia     Hypertension     BENIGN ESSENTIAL    Pacemaker     Pneumonia     Short-term memory loss     Vitamin D deficiency        PAST SURGICAL HISTORY:  Past Surgical History:   Procedure Laterality Date    CARDIAC PACEMAKER PLACEMENT PACEMAKER/DEFIBRILATTOR- [id number]    CARDIAC SURGERY      CATARACT EXTRACTION      CORONARY ANGIOPLASTY WITH STENT PLACEMENT  2000    OPEN HEART WITH TRIPLE BYPASS    OTHER SURGICAL HISTORY      SUBDURAL HEM    PROSTATE SURGERY  1998    RADIAL HEAD EXCISION      TRANSURETHRAL RESECTION OF PROSTATE         SOCIAL HISTORY:  History   Alcohol Use    Yes     Comment: RARELY; NON DRINKER/NO ALCOHOL USE AS PER ALL SCRIPTS      History   Drug Use No     History   Smoking Status    Former Smoker    Packs/day: 1 00    Years: 37 00    Quit date: 2/28/1973   Smokeless Tobacco    Former User     Comment: NEVER USED CHEWING TOBACCO AS PER ALL SCRIPTS        FAMILY HISTORY:  Family History   Problem Relation Age of Onset    Heart attack Mother     Diabetes Maternal Aunt     Diabetes Sister         DUE TO UNDERLYING CONDITIONS WITH COMPLICATIONS        MEDICATIONS:    Current Outpatient Prescriptions:     atorvastatin (LIPITOR) 40 mg tablet, Take 1 tablet by mouth daily, Disp: , Rfl:     cholecalciferol (VITAMIN D3) 1,000 units tablet, Take 1 tablet by mouth daily, Disp: , Rfl:     ELIQUIS 2 5 MG, Take 2 5 mg by mouth 2 (two) times a day, Disp: , Rfl: 5    Ferrous Sulfate (IRON) 325 (65 Fe) MG TABS, Take 1 tablet by mouth daily, Disp: , Rfl:     furosemide (LASIX) 40 mg tablet, Take 40 mg by mouth daily Take 1 1/2 daily , Disp: , Rfl:     insulin aspart (NovoLOG) 100 units/mL injection, Inject under the skin, Disp: , Rfl:     insulin detemir (LEVEMIR) 100 units/mL subcutaneous injection, Inject under the skin, Disp: , Rfl:     isosorbide mononitrate (IMDUR) 60 mg 24 hr tablet, Take 1 tablet by mouth daily, Disp: , Rfl:     losartan (COZAAR) 50 mg tablet, Take 1 tablet by mouth daily, Disp: , Rfl:     metoprolol tartrate (LOPRESSOR) 50 mg tablet, Take 1 tablet by mouth 2 (two) times a day, Disp: , Rfl:     Multiple Vitamin (MULTI-VITAMIN DAILY) TABS, Take 1 tablet by mouth daily, Disp: , Rfl: PHYSICAL EXAM:  Vitals:    08/28/18 1321   BP: 120/70   BP Location: Right arm   Patient Position: Sitting   Pulse: 80   Resp: 16   Temp: 98 3 °F (36 8 °C)   TempSrc: Tympanic   Weight: 70 3 kg (155 lb)   Height: 5' 5" (1 651 m)     Body mass index is 25 79 kg/m²  Physical Exam   Constitutional: He is oriented to person, place, and time  He appears well-developed  No distress  HENT:   Head: Normocephalic  Mouth/Throat: Oropharynx is clear and moist    Eyes: Conjunctivae and EOM are normal  Pupils are equal, round, and reactive to light  No scleral icterus  Neck: Normal range of motion  Neck supple  No JVD present  Cardiovascular: Normal rate, regular rhythm and normal heart sounds  No murmur heard  Pulmonary/Chest: Effort normal and breath sounds normal  No respiratory distress  He has no wheezes  He has no rales  Abdominal: Soft  There is no tenderness  Musculoskeletal: Normal range of motion  He exhibits no edema  Neurological: He is alert and oriented to person, place, and time  Skin: Skin is warm  No rash noted  Psychiatric: He has a normal mood and affect   His behavior is normal        LAB RESULTS:  Results for orders placed or performed in visit on 08/20/18   CBC and differential   Result Value Ref Range    White Blood Cell Count 8 0 3 4 - 10 8 x10E3/uL    Red Blood Cell Count 3 50 (L) 4 14 - 5 80 x10E6/uL    Hemoglobin 10 8 (L) 13 0 - 17 7 g/dL    HCT 33 4 (L) 37 5 - 51 0 %    MCV 95 79 - 97 fL    MCH 30 9 26 6 - 33 0 pg    MCHC 32 3 31 5 - 35 7 g/dL    RDW 13 7 12 3 - 15 4 %    Platelet Count 453 790 - 379 x10E3/uL    Neutrophils 58 Not Estab  %    Lymphocytes 22 Not Estab  %    Monocytes 10 Not Estab  %    Eosinophils 9 Not Estab  %    Basophils Relative 1 Not Estab  %    Neutrophils (Absolute) 4 6 1 4 - 7 0 x10E3/uL    Lymphocytes (Absolute) 1 8 0 7 - 3 1 x10E3/uL    Monocytes (Absolute) 0 8 0 1 - 0 9 x10E3/uL    Eosinophils (Absolute) 0 7 (H) 0 0 - 0 4 x10E3/uL    Basophils (Absolute) 0 1 0 0 - 0 2 x10E3/uL    Immature Granulocytes 0 Not Estab  %    Immature Granulocytes (Absolute) 0 0 0 0 - 0 1 x10E3/uL    Hematology Comments Note:    Urinalysis with reflex to microscopic   Result Value Ref Range    Specific Gravity 1 017 1 005 - 1 030    Ph 6 0 5 0 - 7 5    SL AMB COLOR, URINE Yellow Yellow    Urine Appearance Clear Clear    SL AMB LEUKOCYTE ESTERASE URINE Negative Negative    Protein Trace Negative/Trace    Glucose, Urine Negative Negative    SL AMB KETONE, URINE, QUAL  Negative Negative    SL AMB BLOOD, URINE Negative Negative    SL AMB BILIRUBIN, URINE Negative Negative    Urobilinogen Urine 0 2 0 2 - 1 0 EU/dL    SL AMB NITRITES URINE, QUAL  Negative Negative    Microscopic Examination Comment    Basic metabolic panel   Result Value Ref Range    Glucose 107 (H) 65 - 99 mg/dL    BUN 84 (HH) 10 - 36 mg/dL    Creatinine 2 51 (H) 0 76 - 1 27 mg/dL    eGFR Non African American 21 (L) >59 mL/min/1 73    SL AMB EGFR AFRICAN AMERICAN 24 (L) >59 mL/min/1 73    SL AMB BUN/CREATININE RATIO 33 (H) 10 - 24    Sodium 142 134 - 144 mmol/L    SL AMB POTASSIUM 4 5 3 5 - 5 2 mmol/L    Chloride 98 96 - 106 mmol/L    SL AMB CARBON DIOXIDE 23 20 - 29 mmol/L    CALCIUM 9 1 8 6 - 10 2 mg/dL   Protein / creatinine ratio, urine   Result Value Ref Range    Creatinine, Urine 60 0 Not Estab  mg/dL    SL AMB TOTAL PROTEIN, URINE 19 3 Not Estab  mg/dL    Prot/Creat Ratio, Ur 322 (H) 0 - 200 mg/g creat   PTH, intact and calcium   Result Value Ref Range    PTH, Intact 97 (H) 15 - 65 pg/mL    PTH,INTACT INTERP Comment    Phosphorus   Result Value Ref Range    Phosphorus, Serum 4 6 (H) 2 5 - 4 5 mg/dL       ASSESSMENT and PLAN:      Stage 4 chronic kidney disease (HCC)  Stable at this point with GFR of about 25  Advised to continue what is doing    My goal will be to keep him comfortable    Arteriosclerotic cardiovascular disease  Stable and being monitored by cardiologist   Will advised stopping Eliquis in view of unsteady gait    DM2 (diabetes mellitus, type 2) ([facility] [address] )  No results found for: HGBA1C    No results for input(s): POCGLU in the last 72 hours  Blood Sugar Average: Last 72 hrs:    No new blood glucose report        I will see him back in 6 month      Portions of the record may have been created with voice recognition software  Occasional wrong word or "sound a like" substitutions may have occurred due to the inherent limitations of voice recognition software  Read the chart carefully and recognize, using context, where substitutions have occurred  If you have any questions, please contact the dictating provider

## 2018-08-28 NOTE — ASSESSMENT & PLAN NOTE
Stable at this point with GFR of about 25  Advised to continue what is doing    My goal will be to keep him comfortable

## 2018-08-28 NOTE — PATIENT INSTRUCTIONS
Chronic Kidney Disease   AMBULATORY CARE:   Chronic kidney disease (CKD)  is the gradual and permanent loss of kidney function  Normally, the kidneys remove fluid, chemicals, and waste from your blood  These wastes are turned into urine by your kidneys  CKD may worsen over time and lead to kidney failure  Common symptoms include the following:   · Changes in how often you need to urinate    · Swelling in your arms, legs, or feet    · Shortness of breath    · Fatigue or weakness    · Bad or bitter taste in your mouth    · Nausea, vomiting, or loss of appetite  Seek care immediately if:   · You are confused and very drowsy  · You have a seizure  · You have shortness of breath  Contact your healthcare provider if:   · You suddenly gain or lose more weight than your healthcare provider has told you is okay  · You have itchy skin or a rash  · You urinate more or less than you normally do  · You have blood in your urine  · You have nausea and repeated vomiting  · You have fatigue or muscle weakness  · You have hiccups that will not stop  · You have questions or concerns about your condition or care  Treatment for CKD:  Medicines may be given to decrease blood pressure and get rid of extra fluid  You may also receive medicine to manage health conditions that may occur with CKD  Dialysis is a treatment to remove chemicals and waste from your blood when your kidneys can no longer do this  Surgery may be needed to create an arteriovenous fistula (AVF) in your arm or insert a catheter into your abdomen so that you can receive dialysis  A kidney transplant may be done if your CKD becomes severe  Manage CKD:   · Maintain a healthy weight  Ask your healthcare provider how much you should weigh  Ask him to help you create a weight loss plan if you are overweight  · Exercise 30 to 60 minutes a day, 4 to 7 times a week, or as directed  Ask about the best exercise plan for you   Regular exercise can help you manage CKD, high blood pressure, and diabetes  · Follow your healthcare provider's advice about what to eat and drink  He may tell you to eat food low in sodium (salt), potassium, phosphorus, or protein  You may need to see a dietitian if you need help planning meals  Ask how much liquid to drink each day and which liquids are best for you  · Limit alcohol  Ask how much alcohol is safe for you to drink  A drink of alcohol is 12 ounces of beer, 5 ounces of wine, or 1½ ounces of liquor  · Do not smoke  Nicotine and other chemicals in cigarettes and cigars can cause lung and kidney damage  Ask your healthcare provider for information if you currently smoke and need help to quit  E-cigarettes or smokeless tobacco still contain nicotine  Talk to your healthcare provider before you use these products  · Ask your healthcare provider if you need vaccines  Infections such as pneumonia, influenza, and hepatitis can be more harmful or more likely to occur in a person who has CKD  Vaccines reduce your risk of infection with these viruses  Follow up with your healthcare provider as directed:  Write down your questions so you remember to ask them during your visits  © 2017 [employer] Information is for End User's use only and may not be sold, redistributed or otherwise used for commercial purposes  All illustrations and images included in CareNotes® are the copyrighted property of [employer]  or Mikey Velaz[employer]  The above information is an  only  It is not intended as medical advice for individual conditions or treatments  Talk to your doctor, nurse or pharmacist before following any medical regimen to see if it is safe and effective for you

## 2018-09-25 ENCOUNTER — OFFICE VISIT (OUTPATIENT)
Dept: FAMILY MEDICINE CLINIC | Facility: CLINIC | Age: 83
End: 2018-09-25
Payer: MEDICARE

## 2018-09-25 VITALS
DIASTOLIC BLOOD PRESSURE: 72 MMHG | BODY MASS INDEX: 25.66 KG/M2 | HEIGHT: 65 IN | WEIGHT: 154 LBS | OXYGEN SATURATION: 99 % | SYSTOLIC BLOOD PRESSURE: 142 MMHG | HEART RATE: 60 BPM | TEMPERATURE: 97.1 F

## 2018-09-25 DIAGNOSIS — Z79.4 TYPE 2 DIABETES MELLITUS WITH STAGE 4 CHRONIC KIDNEY DISEASE, WITH LONG-TERM CURRENT USE OF INSULIN (HCC): ICD-10-CM

## 2018-09-25 DIAGNOSIS — Z23 NEEDS FLU SHOT: ICD-10-CM

## 2018-09-25 DIAGNOSIS — Z00.00 MEDICARE ANNUAL WELLNESS VISIT, SUBSEQUENT: ICD-10-CM

## 2018-09-25 DIAGNOSIS — N18.4 TYPE 2 DIABETES MELLITUS WITH STAGE 4 CHRONIC KIDNEY DISEASE, WITH LONG-TERM CURRENT USE OF INSULIN (HCC): ICD-10-CM

## 2018-09-25 DIAGNOSIS — E11.22 TYPE 2 DIABETES MELLITUS WITH STAGE 4 CHRONIC KIDNEY DISEASE, WITH LONG-TERM CURRENT USE OF INSULIN (HCC): ICD-10-CM

## 2018-09-25 DIAGNOSIS — I25.10 ARTERIOSCLEROTIC CARDIOVASCULAR DISEASE: ICD-10-CM

## 2018-09-25 DIAGNOSIS — N18.4 STAGE 4 CHRONIC KIDNEY DISEASE (HCC): Primary | ICD-10-CM

## 2018-09-25 PROCEDURE — G0439 PPPS, SUBSEQ VISIT: HCPCS | Performed by: FAMILY MEDICINE

## 2018-09-25 PROCEDURE — 99214 OFFICE O/P EST MOD 30 MIN: CPT | Performed by: FAMILY MEDICINE

## 2018-09-25 PROCEDURE — 90662 IIV NO PRSV INCREASED AG IM: CPT

## 2018-09-25 PROCEDURE — G0008 ADMIN INFLUENZA VIRUS VAC: HCPCS

## 2018-09-25 NOTE — PROGRESS NOTES
Assessment/Plan:     Diagnoses and all orders for this visit:    Stage 4 chronic kidney disease ([facility] )    Type 2 diabetes mellitus with stage 4 chronic kidney disease, with long-term current use of insulin ([facility] )    Arteriosclerotic cardiovascular disease    Medicare annual wellness visit, subsequent    Needs flu shot  -     Flu Vaccine High Dose Split Preservative Free IM         Doing well  Continue medications  Continue follow-up with various specialists  Vaccine for influenza today  Subjective:      Patient ID: Ruth Venegas is a 80 y o  male  Patient is here for checkup  He has a history of CKD a follows up with Nephrology  He also has diabetes and is on insulin  He sees Endocrinology - next appt in November  Fasting glucose around 70  He is on Levemir 12 AM, 10 PM, Novolog 6, 8, 8 (adjusts)  Last A1c 6 2% in July  Next Eye exam is October - Dr Fab Morales  He has a history of heart disease and is on cholesterol medication  He follows up with Dr J oAnn Rodriguez  His wife thinks he has checked his cholesterol recently  However I do not see any results in the chart  The following portions of the patient's history were reviewed and updated as appropriate:   He  has a past medical history of Asthma; Bell's palsy; Chronic kidney disease; Chronic obstructive lung disease ([facility] ); Diabetes mellitus ([facility] ); Diabetic retinopathy ([facility] [address] ); Hypercholesterolemia; Hypertension; Pacemaker; Pneumonia; Short-term memory loss; and Vitamin D deficiency    He   Patient Active Problem List    Diagnosis Date Noted    Medicare annual wellness visit, subsequent 09/25/2018    Diabetes with retinopathy ([facility] ) 04/17/2017    Stage 4 chronic kidney disease ([facility] ) 12/28/2016    Arteriosclerotic cardiovascular disease 09/24/2013    Chronic kidney disease 09/23/2013    DM2 (diabetes mellitus, type 2) ([facility] ) 09/23/2013     He  has a past surgical history that includes Radial head excision; Cardiac surgery; Cataract extraction; Coronary angioplasty with stent (2000); Other surgical history; Cardiac pacemaker placement; Prostate surgery (1998); and Transurethral resection of prostate  His family history includes Diabetes in his maternal aunt and sister; Heart attack in his mother  He  reports that he quit smoking about 45 years ago  He has a 37 00 pack-year smoking history  He has quit using smokeless tobacco  He reports that he drinks alcohol  He reports that he does not use drugs  Current Outpatient Prescriptions   Medication Sig Dispense Refill    atorvastatin (LIPITOR) 40 mg tablet Take 1 tablet by mouth daily      cholecalciferol (VITAMIN D3) 1,000 units tablet Take 1 tablet by mouth daily      ELIQUIS 2 5 MG Take 2 5 mg by mouth 2 (two) times a day  5    Ferrous Sulfate (IRON) 325 (65 Fe) MG TABS Take 1 tablet by mouth daily      furosemide (LASIX) 40 mg tablet Take 40 mg by mouth daily Take 1 1/2 daily       insulin aspart (NovoLOG) 100 units/mL injection Inject under the skin      insulin detemir (LEVEMIR) 100 units/mL subcutaneous injection Inject under the skin      isosorbide mononitrate (IMDUR) 60 mg 24 hr tablet Take 1 tablet by mouth daily      losartan (COZAAR) 50 mg tablet Take 1 tablet by mouth daily      metoprolol tartrate (LOPRESSOR) 50 mg tablet Take 1 tablet by mouth 2 (two) times a day      Multiple Vitamin (MULTI-VITAMIN DAILY) TABS Take 1 tablet by mouth daily       No current facility-administered medications for this visit        Current Outpatient Prescriptions on File Prior to Visit   Medication Sig    atorvastatin (LIPITOR) 40 mg tablet Take 1 tablet by mouth daily    cholecalciferol (VITAMIN D3) 1,000 units tablet Take 1 tablet by mouth daily    ELIQUIS 2 5 MG Take 2 5 mg by mouth 2 (two) times a day    Ferrous Sulfate (IRON) 325 (65 Fe) MG TABS Take 1 tablet by mouth daily    furosemide (LASIX) 40 mg tablet Take 40 mg by mouth daily Take 1 1/2 daily     insulin aspart (NovoLOG) 100 units/mL injection Inject under the skin    insulin detemir (LEVEMIR) 100 units/mL subcutaneous injection Inject under the skin    isosorbide mononitrate (IMDUR) 60 mg 24 hr tablet Take 1 tablet by mouth daily    losartan (COZAAR) 50 mg tablet Take 1 tablet by mouth daily    metoprolol tartrate (LOPRESSOR) 50 mg tablet Take 1 tablet by mouth 2 (two) times a day    Multiple Vitamin (MULTI-VITAMIN DAILY) TABS Take 1 tablet by mouth daily     No current facility-administered medications on file prior to visit  He has No Known Allergies       Review of Systems   Constitutional: Negative for activity change, appetite change, chills, fatigue, fever and unexpected weight change  HENT: Negative for congestion, ear discharge, ear pain, postnasal drip, sinus pressure and sore throat  Eyes: Negative for discharge and visual disturbance  Respiratory: Negative for cough, shortness of breath and wheezing  Cardiovascular: Negative for chest pain, palpitations and leg swelling  Gastrointestinal: Negative for abdominal pain, constipation, diarrhea, nausea and vomiting  Endocrine: Negative for cold intolerance, heat intolerance, polydipsia and polyuria  Genitourinary: Negative for difficulty urinating and frequency  Musculoskeletal: Negative for arthralgias, back pain, joint swelling and myalgias  Skin: Negative for rash  Neurological: Negative for dizziness, weakness, light-headedness, numbness and headaches  Hematological: Negative for adenopathy  Psychiatric/Behavioral: Negative for behavioral problems, confusion, dysphoric mood, sleep disturbance and suicidal ideas  The patient is not nervous/anxious  Objective:      /72   Pulse 60   Temp (!) 97 1 °F (36 2 °C)   Ht 5' 5" (1 651 m)   Wt 69 9 kg (154 lb)   SpO2 99%   BMI 25 63 kg/m²          Physical Exam   Constitutional: He is oriented to person, place, and time  He appears well-developed and well-nourished   No distress  HENT:   Head: Normocephalic and atraumatic  Cardiovascular: Normal rate, regular rhythm and normal heart sounds  Exam reveals no gallop and no friction rub  No murmur heard  Pulmonary/Chest: Effort normal and breath sounds normal  No respiratory distress  He has no wheezes  He has no rales  He exhibits no tenderness  Musculoskeletal: He exhibits no edema  Neurological: He is alert and oriented to person, place, and time  Skin: He is not diaphoretic  Psychiatric: He has a normal mood and affect  His behavior is normal  Judgment and thought content normal    Nursing note and vitals reviewed

## 2018-09-25 NOTE — PROGRESS NOTES
Assessment and Plan:    Problem List Items Addressed This Visit        Endocrine    DM2 (diabetes mellitus, type 2) ([facility] [address] )       Cardiovascular and Mediastinum    Arteriosclerotic cardiovascular disease       Genitourinary    Stage 4 chronic kidney disease ([facility] ) - Primary       Other    Medicare annual wellness visit, subsequent        Health Maintenance Due   Topic Date Due    HEMOGLOBIN A1C  04/06/1924    DTaP,Tdap,and Td Vaccines (1 - Tdap) 04/06/1945    Diabetic Foot Exam  04/17/2018    DM Eye Exam  08/02/2018    INFLUENZA VACCINE  09/01/2018         HPI:  Den Rick is a 80 y o  male here for his Subsequent Wellness Visit      Patient Active Problem List   Diagnosis    Chronic kidney disease    Diabetes with retinopathy ([facility] )    DM2 (diabetes mellitus, type 2) ([facility] [facility] )    Stage 4 chronic kidney disease ([facility] )    Arteriosclerotic cardiovascular disease    Medicare annual wellness visit, subsequent     Past Medical History:   Diagnosis Date    Asthma     Bell's palsy     Chronic kidney disease     Chronic obstructive lung disease ([facility] [address] )     Diabetes mellitus ([facility] [address] )     [facility]     Hypercholesterolemia     Hypertension     BENIGN ESSENTIAL    Pacemaker     Pneumonia     Short-term memory loss     Vitamin D deficiency      Past Surgical History:   Procedure Laterality Date    CARDIAC PACEMAKER PLACEMENT      PACEMAKER/DEFIBRILATTOR- [id number]    CARDIAC SURGERY      CATARACT EXTRACTION      CORONARY ANGIOPLASTY WITH STENT PLACEMENT  2000    OPEN HEART WITH TRIPLE BYPASS    OTHER SURGICAL HISTORY      SUBDURAL HEM    PROSTATE SURGERY  1998    RADIAL HEAD EXCISION      TRANSURETHRAL RESECTION OF PROSTATE       Family History   Problem Relation Age of Onset    Heart attack Mother     Diabetes Maternal Aunt     Diabetes Sister         DUE TO UNDERLYING CONDITIONS WITH COMPLICATIONS      History   Smoking Status    Former Smoker    Packs/day: 1 00    Years: 37 00  Quit date: 2/28/1973   Smokeless Tobacco    Former User     Comment: NEVER USED CHEWING TOBACCO AS PER ALL SCRIPTS      History   Alcohol Use    Yes     Comment: RARELY; NON DRINKER/NO ALCOHOL USE AS PER ALL SCRIPTS       History   Drug Use No       Current Outpatient Prescriptions   Medication Sig Dispense Refill    atorvastatin (LIPITOR) 40 mg tablet Take 1 tablet by mouth daily      cholecalciferol (VITAMIN D3) 1,000 units tablet Take 1 tablet by mouth daily      ELIQUIS 2 5 MG Take 2 5 mg by mouth 2 (two) times a day  5    Ferrous Sulfate (IRON) 325 (65 Fe) MG TABS Take 1 tablet by mouth daily      furosemide (LASIX) 40 mg tablet Take 40 mg by mouth daily Take 1 1/2 daily       insulin aspart (NovoLOG) 100 units/mL injection Inject under the skin      insulin detemir (LEVEMIR) 100 units/mL subcutaneous injection Inject under the skin      isosorbide mononitrate (IMDUR) 60 mg 24 hr tablet Take 1 tablet by mouth daily      losartan (COZAAR) 50 mg tablet Take 1 tablet by mouth daily      metoprolol tartrate (LOPRESSOR) 50 mg tablet Take 1 tablet by mouth 2 (two) times a day      Multiple Vitamin (MULTI-VITAMIN DAILY) TABS Take 1 tablet by mouth daily       No current facility-administered medications for this visit  No Known Allergies  Immunization History   Administered Date(s) Administered    Influenza Split High Dose Preservative Free IM 09/11/2012, 09/24/2013, 09/25/2014, 09/08/2015, 09/15/2016, 09/18/2017    Influenza TIV (IM) 09/08/2015    Pneumococcal Conjugate 13-Valent 09/08/2015, 09/08/2015    Pneumococcal Polysaccharide PPV23 01/28/2005    Tetanus, adsorbed 02/08/2006    Zoster 04/24/2017       Patient Care Team:  Zhou Perry MD as PCP - Laney Lozano MD as Consulting Physician (Nephrology)  Waylon Oates MD    Medicare Screening Tests and Risk Assessments:  Eboni Vazquez is here for his Subsequent Wellness visit    Last Medicare Wellness visit information reviewed, patient interviewed, no change since last AWV  Health Risk Assessment:  Patient rates overall health as fair  Patient feels that their physical health rating is Same  Eyesight was rated as Same  Hearing was rated as Same  Patient feels that their emotional and mental health rating is Same  Patient states that he has experienced no weight loss or gain in last 6 months  Emotional/Mental Health:  Patient has been feeling nervous/anxious  PHQ-9 Depression Screening:    Frequency of the following problems over the past two weeks:      1  Little interest or pleasure in doing things: 0 - not at all      2  Feeling down, depressed, or hopeless: 0 - not at all  PHQ-2 Score: 0          Broken Bones/Falls: Fall Risk Assessment:    In the past year, patient has experienced: No history of falling in past year          Bladder/Bowel:  Patient has not leaked urine accidently in the last six months  Patient reports no loss of bowel control  Immunizations:  Patient has had a flu vaccination within the last year  Patient has received a pneumonia shot  Patient has received a shingles shot  Patient has received tetanus/diphtheria shot  Home Safety:  Patient does not have trouble with stairs inside or outside of their home  Patient currently reports that there are no safety hazards present in home, working smoke alarms, working carbon monoxide detectors  Preventative Screenings:   prostate cancer screen performed, colon cancer screen completed, cholesterol screen completed, glaucoma eye exam completed,     Nutrition:  Current diet: Regular with servings of the following:    Medications:  Patient is currently taking over-the-counter supplements  Patient is able to manage medications  Lifestyle Choices:  Patient reports no tobacco use  Patient has not smoked or used tobacco in the past   Patient reports no alcohol use  Patient drives a vehicle  Patient wears seat belt          Activities of Daily Living:  Can get out of bed by his or her self, able to dress self, able to make own meals, able to do own shopping, able to bathe self, can do own laundry/housekeeping, can manage own money, pay bills and track expenses    Previous Hospitalizations:  No hospitalization or ED visit in past 12 months        Advanced Directives:  Patient has decided on a power of   Patient has spoken to designated power of   Patient has completed advanced directive  Preventative Screening/Counseling:      Cardiovascular:      General: Risks and Benefits Discussed and Screening Current      Counseling: Healthy Diet and Healthy Weight      Comments: Dr Tia Heredia         Diabetes:      General: Risks and Benefits Discussed and Screening Current      Counseling: Healthy Weight and Healthy Diet          Colorectal Cancer:      General: Screening Not Indicated and Risks and Benefits Discussed          Prostate Cancer:      General: Screening Not Indicated and Risks and Benefits Discussed          Osteoporosis:      General: Screening Not Indicated          AAA:      General: Screening Not Indicated          Glaucoma:      General: Risks and Benefits Discussed and Screening Current          HIV:      General: Screening Not Indicated          Hepatitis C:      General: Screening Not Indicated        Advanced Directives:   Patient has living will for healthcare, patient has an advanced directive  Information on ACP and/or AD provided  End of life assessment reviewed with patient  Provider agrees with end of life decisions   Immunizations:      Influenza: Risks & Benefits Discussed and Influenza Due Today      Pneumococcal: Risks & Benefits Discussed and Lifetime Vaccine Completed      Zostavax: Risks & Benefits Discussed and Zostavax Vaccine UTD      Other Preventative Counseling (Non-Medicare):   Fall Prevention, Increase physical activity and Nutrition Counseling